# Patient Record
Sex: MALE | Race: WHITE | URBAN - METROPOLITAN AREA
[De-identification: names, ages, dates, MRNs, and addresses within clinical notes are randomized per-mention and may not be internally consistent; named-entity substitution may affect disease eponyms.]

---

## 2019-01-27 ENCOUNTER — HOSPITAL ENCOUNTER (OUTPATIENT)
Age: 39
Setting detail: SPECIMEN
Discharge: HOME | End: 2019-01-27
Payer: COMMERCIAL

## 2019-01-27 PROCEDURE — 87804 INFLUENZA ASSAY W/OPTIC: CPT

## 2019-01-28 LAB
RAPID INFLUENZA  B AGN: NEGATIVE
RAPID INFLUENZA A AGN: POSITIVE

## 2024-11-25 ENCOUNTER — HOSPITAL ENCOUNTER (EMERGENCY)
Age: 44
Discharge: HOME | End: 2024-11-25
Attending: EMERGENCY MEDICINE
Payer: COMMERCIAL

## 2024-11-25 VITALS
BODY MASS INDEX: 25.73 KG/M2 | HEIGHT: 72 IN | HEART RATE: 72 BPM | TEMPERATURE: 98.1 F | DIASTOLIC BLOOD PRESSURE: 81 MMHG | RESPIRATION RATE: 18 BRPM | SYSTOLIC BLOOD PRESSURE: 126 MMHG | OXYGEN SATURATION: 98 % | WEIGHT: 190 LBS

## 2024-11-25 DIAGNOSIS — T18.128A FOOD IMPACTION OF ESOPHAGUS, INITIAL ENCOUNTER: Primary | ICD-10-CM
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
## 2024-11-25 PROCEDURE — 99284 EMERGENCY DEPT VISIT MOD MDM: CPT

## 2024-11-25 PROCEDURE — 6360000002 HC RX W HCPCS: Performed by: NURSE PRACTITIONER

## 2024-11-25 PROCEDURE — 96374 THER/PROPH/DIAG INJ IV PUSH: CPT

## 2024-11-25 RX ORDER — GLUCAGON 1 MG/ML
1 KIT INJECTION ONCE
Status: COMPLETED | OUTPATIENT
Start: 2024-11-25 | End: 2024-11-25

## 2024-11-25 RX ADMIN — GLUCAGON 1 MG: 1 INJECTION, POWDER, LYOPHILIZED, FOR SOLUTION INTRAMUSCULAR; INTRAVENOUS at 21:20

## 2024-11-25 ASSESSMENT — PAIN SCALES - GENERAL: PAINLEVEL_OUTOF10: 2

## 2024-11-25 ASSESSMENT — PAIN - FUNCTIONAL ASSESSMENT: PAIN_FUNCTIONAL_ASSESSMENT: 0-10

## 2024-11-30 NOTE — ED PROVIDER NOTES
[facility] EMERGENCY DEPARTMENT  EMERGENCY DEPARTMENT ENCOUNTER        Pt Name: Gonzalo Ritchie  MRN: [id number]  Birthdate 1980  Date of evaluation: 11/25/2024  Provider: SELVIN MCKINNON CNP  PCP: No primary care provider on file.    CHERELLE. I have evaluated this patient.        Triage CHIEF COMPLAINT       Chief Complaint   Patient presents with    food bolus     Patient feels like he has food stuck in his throat          HISTORY OF PRESENT ILLNESS      Chief Complaint: Food bolus    Gonzalo Ritchie is a 44 y.o. male who presents for evaluation after feeling like he has a potato stuck in his throat.  Patient states he was eating potato soup earlier when he suddenly felt like it got lodged.  He attempted to drink water at home but kept vomiting it back up.  He denies any prior history of food bolus, esophageal stricture, esophagitis, or GERD.  He has never had an EGD.  Denies any chest pain or shortness of breath, coughing.    Nursing Notes were all reviewed and agreed with or any disagreements were addressed in the HPI.    REVIEW OF SYSTEMS     Pertinent ROS as noted in HPI.      PAST MEDICAL HISTORY   History reviewed. No pertinent past medical history.    SURGICAL HISTORY   History reviewed. No pertinent surgical history.    CURRENTMEDICATIONS     There are no discharge medications for this patient.      ALLERGIES     Patient has no known allergies.    FAMILYHISTORY     History reviewed. No pertinent family history.     SOCIAL HISTORY       Social History     Socioeconomic History    Marital status:      Spouse name: None    Number of children: None    Years of education: None    Highest education level: None       SCREENINGS    Jacob Coma Scale  Eye Opening: Spontaneous  Best Verbal Response: Oriented  Best Motor Response: Obeys commands  Jacob Coma Scale Score: 15      PHYSICAL EXAM       ED Triage Vitals [11/25/24 2011]   BP Systolic BP